# Patient Record
Sex: FEMALE | Race: WHITE
[De-identification: names, ages, dates, MRNs, and addresses within clinical notes are randomized per-mention and may not be internally consistent; named-entity substitution may affect disease eponyms.]

---

## 2023-08-15 ENCOUNTER — HOSPITAL ENCOUNTER (OUTPATIENT)
Dept: HOSPITAL 47 - ORWHC2ENDO | Age: 63
Discharge: HOME | End: 2023-08-15
Attending: INTERNAL MEDICINE
Payer: COMMERCIAL

## 2023-08-15 VITALS — RESPIRATION RATE: 20 BRPM | DIASTOLIC BLOOD PRESSURE: 73 MMHG | SYSTOLIC BLOOD PRESSURE: 144 MMHG

## 2023-08-15 VITALS — HEART RATE: 77 BPM

## 2023-08-15 VITALS — TEMPERATURE: 97 F

## 2023-08-15 VITALS — BODY MASS INDEX: 25.8 KG/M2

## 2023-08-15 DIAGNOSIS — F12.90: ICD-10-CM

## 2023-08-15 DIAGNOSIS — Z12.11: Primary | ICD-10-CM

## 2023-08-15 DIAGNOSIS — D12.3: ICD-10-CM

## 2023-08-15 PROCEDURE — 88305 TISSUE EXAM BY PATHOLOGIST: CPT

## 2023-08-15 PROCEDURE — 45385 COLONOSCOPY W/LESION REMOVAL: CPT

## 2023-08-15 NOTE — P.PCN
Date of Procedure: 08/15/23


Procedure(s) Performed: 


BRIEF HISTORY: Patient is a 63-year-old pleasant white female scheduled for an 

elective colonoscopy as a part of screening for colon cancer/positive cologuard.





PROCEDURE PERFORMED: Colonoscopy with snare polypectomy. 





PREOPERATIVE DIAGNOSIS: Screening for colon cancer/positive cologuard. 





IV sedation per Anesthesia. 





PROCEDURE: After informed consent was obtained, the patient, was brought into 

the endoscopy unit. IV sedation was administered by Anesthesia under continuous 

monitoring.  Digital rectal examination was normal. Initially the Olympus CF-160

flexible video colonoscope was then inserted in the rectum, gradually advanced 

into the cecum without any difficulty. Careful examination was performed as the 

scope was gradually being withdrawn. Ileocecal valve and the appendiceal orifice

were visualized and appeared normal.  Prep was excellent. Mucosa of the cecum, 

ascending colon appeared normal.  In the transverse colon there was a 1 cm 

broad-based polyp removed by snare polypectomy.  Rest of the, transverse colon, 

descending colon, sigmoid colon, and rectum appeared normal. Retroflexion was 

performed in the rectum and no lesions were seen. The patient tolerated the 

procedure well. 





IMPRESSION: 


1 cm broad-based transverse colon polyp serous posterior polypectomy


Rest of the colon appeared normal





RECOMMENDATIONS:  Findings of this examination were discussed with the patient 

as well as a family.  She was advised to follow with the biopsy results.  If the

biopsy result adenoma she can have a repeat colonoscopy in 5 years.